# Patient Record
Sex: FEMALE | Race: WHITE | HISPANIC OR LATINO | Employment: OTHER | URBAN - METROPOLITAN AREA
[De-identification: names, ages, dates, MRNs, and addresses within clinical notes are randomized per-mention and may not be internally consistent; named-entity substitution may affect disease eponyms.]

---

## 2017-01-10 DIAGNOSIS — Z79.890 POSTMENOPAUSAL HRT (HORMONE REPLACEMENT THERAPY): ICD-10-CM

## 2017-01-10 DIAGNOSIS — I10 ESSENTIAL HYPERTENSION: ICD-10-CM

## 2017-01-10 NOTE — TELEPHONE ENCOUNTER
Was the patient seen in the last year in this department? Yes     Does patient have an active prescription for medications requested? No     Received Request Via: Pharmacy    This needs to be addressed  ASAP

## 2017-01-11 RX ORDER — ESTRADIOL 1 MG/1
TABLET ORAL
Qty: 90 TAB | Refills: 0 | Status: SHIPPED | OUTPATIENT
Start: 2017-01-11 | End: 2017-06-08 | Stop reason: SDUPTHER

## 2017-01-11 RX ORDER — LISINOPRIL 40 MG/1
40 TABLET ORAL DAILY
Qty: 90 TAB | Refills: 0 | Status: SHIPPED | OUTPATIENT
Start: 2017-01-11 | End: 2018-03-22

## 2017-01-11 RX ORDER — AMLODIPINE BESYLATE 10 MG/1
TABLET ORAL
Qty: 90 TAB | Refills: 0 | Status: SHIPPED | OUTPATIENT
Start: 2017-01-11 | End: 2017-06-21 | Stop reason: SDUPTHER

## 2017-01-11 RX ORDER — LISINOPRIL 40 MG/1
TABLET ORAL
Qty: 90 TAB | Refills: 0 | Status: SHIPPED | OUTPATIENT
Start: 2017-01-11 | End: 2017-06-08 | Stop reason: SDUPTHER

## 2017-01-11 RX ORDER — ESTRADIOL 1 MG/1
1 TABLET ORAL DAILY
Qty: 90 TAB | Refills: 0 | Status: SHIPPED | OUTPATIENT
Start: 2017-01-11 | End: 2018-03-22

## 2017-02-08 ENCOUNTER — TELEPHONE (OUTPATIENT)
Dept: MEDICAL GROUP | Facility: CLINIC | Age: 82
End: 2017-02-08

## 2017-02-08 NOTE — TELEPHONE ENCOUNTER
ANNUAL WELLNESS VISIT PRE-VISIT PLANNING     1.  Reviewed last PCP office visit assessment and plan notes: Yes    2.  If any orders were placed last visit do we have Results/Consult Notes?        •  Labs? Yes        •  Imaging? No        •  Referrals? No     3.  Patient Care Coordination Note was updated with diagnosis information:  No    4.  Patient is due for these Health Maintenance Topics:   Health Maintenance Due   Topic Date Due   • Annual Wellness Visit  01/02/1926   • IMM ZOSTER VACCINE  01/02/1986DUE   • IMM INFLUENZA (1) 09/01/2016          •  CHRISISE letter was faxed to:   for  records    5.  Immunizations were updated in [employer] using WebIZ?: Yes       •  Web Iz Recommendations:  Shingles, tdap       •  Is patient due for Tdap/Shingles? Yes.  If yes, was patient alerted of copay? Yes                                  7.  Updated Care Team with [email or web address] and all specialists?        •   Gait devices, O2, CPAP, etc: no        •   Eye professional: yes       •   Other specialists (GYN, cardiology, endo, etc): N\A    8.  Is patient in need of any refills prior to office visit? No       •    Separate refill encounter created?: no    9.  Patient was informed to arrive 15 min prior to their scheduled appointment and bring in their medication bottles? yes    10.  Patient was advised: “This is a free wellness visit. The provider will screen for medical conditions to help you stay healthy. If you have other concerns to address you may be asked to discuss these at a separate visit or there may be an additional fee.”  Yes

## 2017-02-13 ENCOUNTER — OFFICE VISIT (OUTPATIENT)
Dept: MEDICAL GROUP | Facility: CLINIC | Age: 82
End: 2017-02-13
Payer: MEDICARE

## 2017-02-13 VITALS
TEMPERATURE: 98.1 F | BODY MASS INDEX: 16.93 KG/M2 | WEIGHT: 92 LBS | OXYGEN SATURATION: 96 % | HEIGHT: 62 IN | SYSTOLIC BLOOD PRESSURE: 136 MMHG | HEART RATE: 75 BPM | DIASTOLIC BLOOD PRESSURE: 60 MMHG

## 2017-02-13 DIAGNOSIS — Z72.0 TOBACCO USE: ICD-10-CM

## 2017-02-13 DIAGNOSIS — Z79.890 POSTMENOPAUSAL HRT (HORMONE REPLACEMENT THERAPY): ICD-10-CM

## 2017-02-13 DIAGNOSIS — M25.552 HIP PAIN, LEFT: ICD-10-CM

## 2017-02-13 DIAGNOSIS — I10 ESSENTIAL HYPERTENSION: ICD-10-CM

## 2017-02-13 PROCEDURE — 4004F PT TOBACCO SCREEN RCVD TLK: CPT | Performed by: INTERNAL MEDICINE

## 2017-02-13 PROCEDURE — G0439 PPPS, SUBSEQ VISIT: HCPCS | Performed by: INTERNAL MEDICINE

## 2017-02-13 RX ORDER — HYDROCODONE BITARTRATE AND ACETAMINOPHEN 5; 325 MG/1; MG/1
1 TABLET ORAL EVERY 8 HOURS PRN
Qty: 60 TAB | Refills: 0 | Status: SHIPPED | OUTPATIENT
Start: 2017-02-13 | End: 2017-08-17 | Stop reason: SDUPTHER

## 2017-02-13 ASSESSMENT — PATIENT HEALTH QUESTIONNAIRE - PHQ9: CLINICAL INTERPRETATION OF PHQ2 SCORE: 0

## 2017-02-13 ASSESSMENT — ACTIVITIES OF DAILY LIVING (ADL): TRANSPORTATION COMMENTS: DAUGHTER DRIVES HER

## 2017-02-13 NOTE — MR AVS SNAPSHOT
"Bibi Goldman   2017 11:00 AM   Office Visit   MRN: [id number]    Department:  [facility]   Dept Phone:  [phone]    Description:  Female : 1926   Provider:  Trevor Irby M.D.; [facility]            Reason for Visit     Annual Wellness Visit           Allergies as of 2017     No Known Allergies      You were diagnosed with     Hip pain, left   [[id number]]         Vital Signs     Blood Pressure Pulse Temperature Height Weight Body Mass Index    136/60 mmHg 75 36.7 °C (98.1 °F) 1.575 m (5' 2.01\") 41.731 kg (92 lb) 16.82 kg/m2    Oxygen Saturation Last Menstrual Period Smoking Status             96% 1959 Current Every Day Smoker         Basic Information     Date Of Birth Sex Race Ethnicity Preferred Language    1926 Female  or   Origin (Nepali,Faroese,Stateless,Akbar, etc) English      Problem List              ICD-10-CM Priority Class Noted - Resolved    HTN (hypertension) I10   2011 - Present    Postmenopausal HRT (hormone replacement therapy) Z79.890   2011 - Present    Visual changes H53.9   2011 - Present    Dyslipidemia E78.5   2012 - Present    Hip pain M25.559   2013 - Present    Jaw pain R68.84   2013 - Present    Nuclear cataract of left eye H25.12   2016 - Present      Health Maintenance        Date Due Completion Dates    IMM ZOSTER VACCINE 1986 ---    IMM INFLUENZA (1) 2016 ---    IMM PNEUMOCOCCAL 65+ (ADULT) LOW/MEDIUM RISK SERIES (2 of 2 - PPSV23) 2017    IMM DTaP/Tdap/Td Vaccine (2 - Td) 2021            Current Immunizations     13-VALENT PCV PREVNAR 2016    Tdap Vaccine 2011      Below and/or attached are the medications your provider expects you to take. Review all of your home medications and newly ordered medications with your provider and/or pharmacist. Follow medication instructions as directed by your provider and/or " pharmacist. Please keep your medication list with you and share with your provider. Update the information when medications are discontinued, doses are changed, or new medications (including over-the-counter products) are added; and carry medication information at all times in the event of emergency situations     Allergies:  No Known Allergies          Medications  Valid as of: February 13, 2017 -  1:00 PM    Generic Name Brand Name Tablet Size Instructions for use    AmLODIPine Besylate (Tab) NORVASC 10 MG TAKE ONE TABLET BY MOUTH ONCE DAILY        Cholecalciferol (Tab) cholecalciferol 1000 UNIT Take 1,000 Units by mouth every day.        Docusate Sodium (Cap) COLACE 100 MG Take 100 mg by mouth 2 times a day as needed for Constipation (Takes every other day).        Estradiol (Tab) ESTRACE 1 MG TAKE ONE TABLET BY MOUTH ONCE DAILY        Estradiol (Tab) ESTRACE 1 MG Take 1 Tab by mouth every day.        Hydrocodone-Acetaminophen (Tab) NORCO 5-325 MG Take 1 Tab by mouth every 8 hours as needed.        Lisinopril (Tab) PRINIVIL, ZESTRIL 40 MG TAKE ONE TABLET BY MOUTH ONCE DAILY        Lisinopril (Tab) PRINIVIL, ZESTRIL 40 MG Take 1 Tab by mouth every day.        .                 Medicines prescribed today were sent to:     [facility] [address] [place] [address]    Phone: [phone] Fax: [phone]    Open 24 Hours?: No      Medication refill instructions:       If your prescription bottle indicates you have medication refills left, it is not necessary to call your provider’s office. Please contact your pharmacy and they will refill your medication.    If your prescription bottle indicates you do not have any refills left, you may request refills at any time through one of the following ways: The online Yeelink system (except Urgent Care), by calling your provider’s office, or by asking your pharmacy to contact your provider’s office with a refill  request. Medication refills are processed only during regular business hours and may not be available until the next business day. Your provider may request additional information or to have a follow-up visit with you prior to refilling your medication.   *Please Note: Medication refills are assigned a new Rx number when refilled electronically. Your pharmacy may indicate that no refills were authorized even though a new prescription for the same medication is available at the pharmacy. Please request the medicine by name with the pharmacy before contacting your provider for a refill.        Other Notes About Your Plan     Please query provider on next office visit:    In your medical opinion since BMI < 19 could this patient have dx of Cachexia R64           [employer] Access Code: [email or web address]  Expires: 3/15/2017  1:00 PM    [employer]  A secure, online tool to manage your health information     [employer]’s [employer]® is a secure, online tool that connects you to your personalized health information from the privacy of your home -- day or night - making it very easy for you to manage your healthcare. Once the activation process is completed, you can even access your medical information using the Wave[employer] Engines [place], which is available for free in the Apple [place] store or Google Play store.     [employer] provides the following levels of access (as shown below):   My Chart Features   [facility] Doctor [facility]  Urgent  Care [facility]  Primary Care  Doctor   Email your healthcare team securely and privately 24/7 X X X    Manage appointments: schedule your next appointment; view details of past/upcoming appointments X      Request prescription refills. X      View recent personal medical records, including lab and immunizations X X X X   View health record, including health history, allergies, medications X X X X   Read reports about your outpatient visits, procedures, consult and ER notes X X X X    See your discharge summary, which is a recap of your hospital and/or ER visit that includes your diagnosis, lab results, and care plan. X X       How to register for [employer]Xchange:  1. Go to  [email or web address].  2. Click on the Sign Up Now box, which takes you to the New Member Sign Up page. You will need to provide the following information:  a. Enter your ValveXchange Access Code exactly as it appears at the top of this page. (You will not need to use this code after you’ve completed the sign-up process. If you do not sign up before the expiration date, you must request a new code.)   b. Enter your date of birth.   c. Enter your home email address.   d. Click Submit, and follow the next screen’s instructions.  3. Create a ValveXchange ID. This will be your ValveXchange login ID and cannot be changed, so think of one that is secure and easy to remember.  4. Create a ValveXchange password. You can change your password at any time.  5. Enter your Password Reset Question and Answer. This can be used at a later time if you forget your password.   6. Enter your e-mail address. This allows you to receive e-mail notifications when new information is available in ValveXchange.  7. Click Sign Up. You can now view your health information.    For assistance activating your [employer]Xchange account, call [phone]

## 2017-02-13 NOTE — PROGRESS NOTES
Chief Complaint   Patient presents with   • Annual Wellness Visit         HPI:   Bibi is a 89 y.o. female here for Medicare Annual Wellness Visit, currently on lisinopril and amlodipine for hypertension denies having dry cough or lower extremity edema, on  hydrocodone as needed for arthritic hip pain and Colace as needed for constipation, denies having abdominal pain, on Estrace for HRT denies having chest pain, motor or sensory loss.        Patient Active Problem List    Diagnosis Date Noted   • Nuclear cataract of left eye 01/12/2016   • Hip pain 08/26/2013   • Jaw pain 08/26/2013   • Dyslipidemia 02/23/2012   • HTN (hypertension) 12/01/2011   • Postmenopausal HRT (hormone replacement therapy) 12/01/2011   • Visual changes 12/01/2011       Current Outpatient Prescriptions   Medication Sig Dispense Refill   • lisinopril (PRINIVIL, ZESTRIL) 40 MG tablet TAKE ONE TABLET BY MOUTH ONCE DAILY 90 Tab 0   • estradiol (ESTRACE) 1 MG Tab TAKE ONE TABLET BY MOUTH ONCE DAILY 90 Tab 0   • amlodipine (NORVASC) 10 MG Tab TAKE ONE TABLET BY MOUTH ONCE DAILY 90 Tab 0   • hydrocodone-acetaminophen (NORCO) 5-325 MG Tab per tablet Take 1 Tab by mouth every 8 hours as needed. 60 Tab 0   • vitamin D (CHOLECALCIFEROL) 1000 UNIT Tab Take 1,000 Units by mouth every day.     • estradiol (ESTRACE) 1 MG Tab Take 1 Tab by mouth every day. 90 Tab 0   • lisinopril (PRINIVIL, ZESTRIL) 40 MG tablet Take 1 Tab by mouth every day. 90 Tab 0   • docusate sodium (COLACE) 100 MG Cap Take 100 mg by mouth 2 times a day as needed for Constipation (Takes every other day).       No current facility-administered medications for this visit.           If not adherent, not taking due to:  Current supplements as per medication list.   Chronic narcotic pain medicines: yes    Allergies: Review of patient's allergies indicates no known allergies.    Current social contact/activities: spends time with family     Is patient current with immunizations?  no   If no,  due for shingles, tdap    She  reports that she has been smoking Cigarettes.  She has a 3.75 pack-year smoking history. She has never used smokeless tobacco. She reports that she drinks about 1.2 oz of alcohol per week. She reports that she does not use illicit drugs.  Ready to quit: Not Answered  Counseling given: Yes        DPA/Advanced Directive:  Patient does not have an advanced directive.  A packet and workshop information was provided    ROS:    Gait: Uses no assistive device   Ostomy: no   Other tubes: no   Amputations: no   Chronic oxygen use no   Last eye exam 1 year ago    : Denies incontinence.             Depression Screening    Little interest or pleasure in doing things?  0 - not at all  Feeling down, depressed, or hopeless?  0 - not at all  Patient Health Questionnaire Score: 0    If depressive symptoms identified deferred to follow up visit unless specifically addressed in assessment and plan.    Screening for Cognitive Impairment    Three Minute Recall (banana, sunrise, fence)  0/3    Draw clock face with all 12 numbers set to the hand to show 10 minutes past 11 o'clock  1 3/5  Cognitive concerns identified deferred for follow up unless specifically addressed in assessment and plan.    Fall Risk Assessment    Has the patient had two or more falls in the last year or any fall with injury in the last year?  No    Safety Assessment    Throw rugs on floor.  Yes  Handrails on all stairs.  Yes  Good lighting in all hallways.  Yes  Difficulty hearing.  No  Patient counseled about all safety risks that were identified.    Functional Assessment ADLs    Are there any barriers preventing you from cooking for yourself or meeting nutritional needs?  No.    Are there any barriers preventing you from driving safely or obtaining transportation?  Yes. Daughter drives her  Are there any barriers preventing you from using a telephone or calling for help?  No.    Are there any barriers preventing you from shopping?   Yes. Daughter does all the shopping/lives with daughter  Are there any barriers preventing you from taking care of your own finances?  Yes. Daughter does this for her   Are there any barriers preventing you from managing your medications?  Yes. Daughter does this for her   Are currently engaging any exercise or physical activity?  Yes.  rocael    Health Maintenance Summary                Annual Wellness Visit Overdue 1/2/1926     IMM ZOSTER VACCINE Overdue 1/2/1986     IMM INFLUENZA Overdue 9/1/2016     IMM PNEUMOCOCCAL 65+ (ADULT) LOW/MEDIUM RISK SERIES Next Due 11/7/2017      Done 11/7/2016 Imm Admin: Pneumococcal Conjugate Vaccine (Prevnar/PCV-13)    IMM DTaP/Tdap/Td Vaccine Next Due 1/27/2021      Done 1/27/2011 Imm Admin: Tdap Vaccine          Patient Care Team:  Trevor Irby M.D. as PCP - General    Social History   Substance Use Topics   • Smoking status: Current Every Day Smoker -- 0.25 packs/day for 15 years     Types: Cigarettes   • Smokeless tobacco: Never Used   • Alcohol Use: 1.2 oz/week     1 Cans of beer, 1 Shots of liquor per week      Comment: 1 daily in the evening       Family History   Problem Relation Age of Onset   • Lung Disease Mother    • Diabetes Maternal Aunt      She  has a past medical history of HTN; Postmenopausal HRT (hormone replacement therapy); MEDICAL HOME; Arthritis; Heart murmur; Hiatus hernia syndrome; Cataract; and Hip pain (8/26/2013).   Past Surgical History   Procedure Laterality Date   • Abdominal hysterectomy total  1960           Exam:     Last menstrual period 01/01/1959. There is no weight on file to calculate BMI.    Hearing; good   Dentition ;  Lower implant  Alert, oriented in no acute distress.  Eye contact is good, speech goal directed, affect calm      Assessment and Plan. The following treatment and monitoring plan is recommended:    1. Hypertension; blood pressure is stable on amlodipine 10 mg on lisinopril 40 mg daily.    2. HRT/postmenopausal syndrome;  clinically is stable on Estrace 1 mg daily.    3. chronic intermittent left hip pain; per patient responds to Norco 5/325 mg as needed without any side effects.     4. Chronic tobacco and alcohol use; patient refuses to quit smoking.            Patient is advised regarding preventive and supportive care, diet and lifestyle modification, daily walking ,exercise , tobacco and alcohol cessation, refill medications.    Please note that this dictation was created using voice recognition software. I have worked with consultants from the vendor as well as technical experts from [facility] [employer] to optimize the interface. I have made every reasonable attempt to correct obvious errors, but I expect that there are errors of grammar and possibly content that I did not discover before finalizing the note.                            Services needed:   Health Care Screening recommendations as per orders if indicated.  Referrals offered: PT/OT/Nutrition counseling/Behavioral Health/Smoking cessation as per orders if indicated.    Discussion today about general wellness and lifestyle habits:    · Prevent falls and reduce trip hazards; Cautioned about securing or removing rugs.  · Have a working fire alarm and carbon monoxide detector;   · Engage in regular physical activity and social activities   ·       Follow-up: per asppt

## 2017-06-09 RX ORDER — LISINOPRIL 40 MG/1
TABLET ORAL
Qty: 90 TAB | Refills: 0 | Status: SHIPPED | OUTPATIENT
Start: 2017-06-09 | End: 2017-09-18 | Stop reason: SDUPTHER

## 2017-06-09 RX ORDER — ESTRADIOL 1 MG/1
TABLET ORAL
Qty: 90 TAB | Refills: 0 | Status: SHIPPED | OUTPATIENT
Start: 2017-06-09 | End: 2018-03-22

## 2017-06-21 RX ORDER — AMLODIPINE BESYLATE 10 MG/1
TABLET ORAL
Qty: 90 TAB | Refills: 0 | Status: SHIPPED | OUTPATIENT
Start: 2017-06-21 | End: 2017-09-18 | Stop reason: SDUPTHER

## 2017-08-17 DIAGNOSIS — M25.552 HIP PAIN, LEFT: ICD-10-CM

## 2017-08-18 RX ORDER — HYDROCODONE BITARTRATE AND ACETAMINOPHEN 5; 325 MG/1; MG/1
1 TABLET ORAL EVERY 8 HOURS PRN
Qty: 60 TAB | Refills: 0 | Status: SHIPPED | OUTPATIENT
Start: 2017-08-18 | End: 2018-07-25 | Stop reason: SDUPTHER

## 2017-08-25 NOTE — TELEPHONE ENCOUNTER
Patient daughter called upset about her mothers Hydrocodone, Located it in the folder in the front . She stated that she had never been left any messages concerning this,  She was informed that she could pick this up at her earliest conveince  .

## 2017-09-18 RX ORDER — AMLODIPINE BESYLATE 10 MG/1
TABLET ORAL
Qty: 90 TAB | Refills: 0 | Status: SHIPPED | OUTPATIENT
Start: 2017-09-18 | End: 2018-03-22 | Stop reason: SDUPTHER

## 2017-09-18 RX ORDER — LISINOPRIL 40 MG/1
TABLET ORAL
Qty: 90 TAB | Refills: 0 | Status: SHIPPED | OUTPATIENT
Start: 2017-09-18 | End: 2018-03-22 | Stop reason: SDUPTHER

## 2017-12-04 ENCOUNTER — PATIENT OUTREACH (OUTPATIENT)
Dept: HEALTH INFORMATION MANAGEMENT | Facility: OTHER | Age: 82
End: 2017-12-04

## 2017-12-04 NOTE — PROGRESS NOTES
Attempt #:1    WebIZ Checked & Epic Updated: Yes  · WebIZ Recommendations: FLU, PNEUMOVAX (PPSV23) and ZOSTAVAX (Shingles)  · Is patient due for Tdap? NO  · Is patient due for Shingles? YES. Patient was not notified of copay/out of pocket cost.  HealthConnect Verified: yes  Verify PCP: yes    Communication Preference Obtained: yes     Review Care Team: yes    Annual Wellness Visit Scheduling  1. Scheduling Status:Scheduled    Care Gap Scheduling (Attempt to Schedule EACH Overdue Care Gap!)     Health Maintenance Due   Topic Date Due   • Annual Wellness Visit  01/02/1926   • IMM ZOSTER VACCINE  01/02/1986   • IMM INFLUENZA (1) 09/01/2017   • IMM PNEUMOCOCCAL 65+ (ADULT) LOW/MEDIUM RISK SERIES (2 of 2 - PPSV23) 11/07/2017        Scheduled patient for Annual Wellness Visit       [employer] Activation: sent activation code  [employer] [place]: no  Virtual Visits: no  Opt In to Text Messages: no

## 2018-01-31 ENCOUNTER — TELEPHONE (OUTPATIENT)
Dept: MEDICAL GROUP | Facility: PHYSICIAN GROUP | Age: 83
End: 2018-01-31

## 2018-01-31 NOTE — TELEPHONE ENCOUNTER
"I spoke with patient's daughter Preeti and she states \"I know you can't disclose where  went as you want to keep his patients\".  I did inform Preeti that this is not the case we just do not have any information about his leave.  I asked her if she would like the number to central scheduling to find a PCP in her area and she stated she had it and call was ended.  As I am not sure if Preeti intentionally ended the call I called her back and left her a voicemail with the number to arrange a new patient appointment.   "

## 2018-01-31 NOTE — TELEPHONE ENCOUNTER
VOICEMAIL  1. Caller Name: Preeti - daughter                     Call Back Number: [phone] (home)     2. Message: Patient's daughter LVM requesting a call back in regards to  no longer being at [facility].     3. Patient approves office to leave a detailed voicemail/MyChart message: yes

## 2018-03-22 ENCOUNTER — OFFICE VISIT (OUTPATIENT)
Dept: MEDICAL GROUP | Facility: PHYSICIAN GROUP | Age: 83
End: 2018-03-22
Payer: MEDICARE

## 2018-03-22 VITALS
RESPIRATION RATE: 14 BRPM | TEMPERATURE: 97.5 F | OXYGEN SATURATION: 96 % | HEART RATE: 78 BPM | WEIGHT: 88 LBS | HEIGHT: 61 IN | BODY MASS INDEX: 16.62 KG/M2

## 2018-03-22 DIAGNOSIS — E78.5 DYSLIPIDEMIA: ICD-10-CM

## 2018-03-22 DIAGNOSIS — I10 ESSENTIAL HYPERTENSION: ICD-10-CM

## 2018-03-22 DIAGNOSIS — Z72.0 TOBACCO ABUSE: ICD-10-CM

## 2018-03-22 DIAGNOSIS — R63.4 WEIGHT LOSS: ICD-10-CM

## 2018-03-22 DIAGNOSIS — M25.552 PAIN OF LEFT HIP JOINT: ICD-10-CM

## 2018-03-22 DIAGNOSIS — Z91.81 RISK FOR FALLS: ICD-10-CM

## 2018-03-22 PROCEDURE — 99214 OFFICE O/P EST MOD 30 MIN: CPT | Performed by: NURSE PRACTITIONER

## 2018-03-22 RX ORDER — LISINOPRIL 40 MG/1
40 TABLET ORAL DAILY
Qty: 90 TAB | Refills: 3 | Status: SHIPPED | OUTPATIENT
Start: 2018-03-22 | End: 2019-06-29 | Stop reason: SDUPTHER

## 2018-03-22 RX ORDER — AMLODIPINE BESYLATE 10 MG/1
10 TABLET ORAL DAILY
Qty: 90 TAB | Refills: 3 | Status: SHIPPED | OUTPATIENT
Start: 2018-03-22 | End: 2019-06-29 | Stop reason: SDUPTHER

## 2018-03-22 ASSESSMENT — PATIENT HEALTH QUESTIONNAIRE - PHQ9: CLINICAL INTERPRETATION OF PHQ2 SCORE: 0

## 2018-03-22 NOTE — PROGRESS NOTES
"Chief Complaint   Patient presents with   • Establish Care   • Medication Refill   • Weight Loss   • Hypertension       Subjective:   Bibi Goldman is a 89 y.o. female here today to establish care and for evaluation and management of:    HTN (hypertension)  Taking lisinopril 40 mg, amlodipine 10 mg daily.      Dyslipidemia  Not currently on any medications.     Hip pain  Taking norco 5/325 twice a week, taking 1/2 pill at a time.      Weight loss  Daughter reports that she has had recent weight loss.  Weight today is 88 pounds down from 92.  Daughter reports that she has memory loss and is feeding dog her food.     Risk for falls  No recent falls reported.          Current medicines (including changes today)  Current Outpatient Prescriptions   Medication Sig Dispense Refill   • lisinopril (PRINIVIL, ZESTRIL) 40 MG tablet Take 1 Tab by mouth every day. 90 Tab 3   • amLODIPine (NORVASC) 10 MG Tab Take 1 Tab by mouth every day. 90 Tab 3   • hydrocodone-acetaminophen (NORCO) 5-325 MG Tab per tablet Take 1 Tab by mouth every 8 hours as needed. 60 Tab 0   • vitamin D (CHOLECALCIFEROL) 1000 UNIT Tab Take 1,000 Units by mouth every day.       No current facility-administered medications for this visit.      She  has a past medical history of Arthritis; Cataract; Heart murmur; Hiatus hernia syndrome; Hip pain (8/26/2013); HTN; Hypertension; MEDICAL HOME; and Postmenopausal HRT (hormone replacement therapy).    ROS as stated in hpi  No chest pain, no shortness of breath, no abdominal pain       Objective:     Pulse 78, temperature 36.4 °C (97.5 °F), resp. rate 14, height 1.549 m (5' 1\"), weight 39.9 kg (88 lb), last menstrual period 01/01/1959, SpO2 96 %. Body mass index is 16.63 kg/m². Weight is down 4 pounds since February.   Physical Exam:  Constitutional: Alert, no distress.  Skin: Warm, dry, good turgor,no cyanosis, no rashes in visible areas.  Eye: Equal, round and reactive, conjunctiva clear, lids normal.  Ears: No " tenderness, no discharge.  External canals are without any significant edema or erythema..  Gross auditory acuity is intact.  Nose: symmetrical without tenderness, no discharge.  Mouth/Throat: lips without lesion.  Oropharynx clear.   Neck: Trachea midline, no masses, no obvious thyroid enlargement.. No cervical or supraclavicular lymphadenopathy. Range of motion within normal limits.  Neuro: Cranial nerves 2-12 grossly intact.  No sensory deficit.  Respiratory: Unlabored respiratory effort, lungs clear to auscultation, no wheezes, no ronchi.  Cardiovascular: Normal S1, S2, no murmur, no edema.  Abdomen: Soft, non-tender, no masses, no guarding,  no hepatosplenomegaly.  Psych: Alert and oriented X2.  Short term memory loss.          Assessment and Plan:   The following treatment plan was discussed    1. Essential hypertension  This is a new problem to me. Chronic. Stable. Continue with lisinopril and amlodipine daily. Monitor and follow.    2. Dyslipidemia  This is a new problem to me. Chronic. No longer on medications. BMI 16.63. Monitor and follow.    3. Pain of left hip joint  This is a new problem to me. Chronic. Ongoing issue. Taking Norco one to 2 tablets weekly. 4 chronic hip pain. Will return in 6 weeks for weight check and UDS.    4. Risk for falls  This is a new problem to me. Chronic. Ongoing issue. Discussed fall risks with patient and her daughter. Discussed importance of removing any throw rugs.  - Patient identified as fall risk.  Appropriate orders and counseling given.    5. Weight loss  This is a new problem to me. Acute. Unknown etiology. Discussed high calorie snacks that patient may enjoy. Patient to return in 6 weeks for weight check. Also will check a CBC and chemistry panel to rule out any metabolic issues or anemia. Monitor and follow.  - CBC WITH DIFFERENTIAL; Future  - COMP METABOLIC PANEL; Future    6. Tobacco abuse  This is a new problem to me. Chronic ongoing issue. Daily smoker. She is  not on any COPD medications. O2 saturation 96% today. Monitor.      Followup: Return in about 6 weeks (around 5/3/2018) for weight loss/uds.

## 2018-03-22 NOTE — ASSESSMENT & PLAN NOTE
Daughter reports that she has had recent weight loss.  Weight today is 88 pounds down from 92.  Daughter reports that she has memory loss and is feeding dog her food.

## 2018-05-10 ENCOUNTER — APPOINTMENT (OUTPATIENT)
Dept: MEDICAL GROUP | Facility: PHYSICIAN GROUP | Age: 83
End: 2018-05-10
Payer: MEDICARE

## 2018-07-10 ENCOUNTER — HOSPITAL ENCOUNTER (OUTPATIENT)
Dept: LAB | Facility: MEDICAL CENTER | Age: 83
End: 2018-07-10
Attending: NURSE PRACTITIONER
Payer: MEDICARE

## 2018-07-10 DIAGNOSIS — R63.4 WEIGHT LOSS: ICD-10-CM

## 2018-07-10 LAB
ALBUMIN SERPL BCP-MCNC: 4.1 G/DL (ref 3.2–4.9)
ALBUMIN/GLOB SERPL: 1.4 G/DL
ALP SERPL-CCNC: 105 U/L (ref 30–99)
ALT SERPL-CCNC: 10 U/L (ref 2–50)
ANION GAP SERPL CALC-SCNC: 7 MMOL/L (ref 0–11.9)
AST SERPL-CCNC: 17 U/L (ref 12–45)
BASOPHILS # BLD AUTO: 0.6 % (ref 0–1.8)
BASOPHILS # BLD: 0.07 K/UL (ref 0–0.12)
BILIRUB SERPL-MCNC: 0.6 MG/DL (ref 0.1–1.5)
BUN SERPL-MCNC: 13 MG/DL (ref 8–22)
CALCIUM SERPL-MCNC: 9.5 MG/DL (ref 8.5–10.5)
CHLORIDE SERPL-SCNC: 101 MMOL/L (ref 96–112)
CO2 SERPL-SCNC: 28 MMOL/L (ref 20–33)
CREAT SERPL-MCNC: 0.93 MG/DL (ref 0.5–1.4)
EOSINOPHIL # BLD AUTO: 0.12 K/UL (ref 0–0.51)
EOSINOPHIL NFR BLD: 1.1 % (ref 0–6.9)
ERYTHROCYTE [DISTWIDTH] IN BLOOD BY AUTOMATED COUNT: 46.5 FL (ref 35.9–50)
GLOBULIN SER CALC-MCNC: 2.9 G/DL (ref 1.9–3.5)
GLUCOSE SERPL-MCNC: 114 MG/DL (ref 65–99)
HCT VFR BLD AUTO: 46.5 % (ref 37–47)
HGB BLD-MCNC: 15.4 G/DL (ref 12–16)
IMM GRANULOCYTES # BLD AUTO: 0.02 K/UL (ref 0–0.11)
IMM GRANULOCYTES NFR BLD AUTO: 0.2 % (ref 0–0.9)
LYMPHOCYTES # BLD AUTO: 4.21 K/UL (ref 1–4.8)
LYMPHOCYTES NFR BLD: 39.1 % (ref 22–41)
MCH RBC QN AUTO: 30.9 PG (ref 27–33)
MCHC RBC AUTO-ENTMCNC: 33.1 G/DL (ref 33.6–35)
MCV RBC AUTO: 93.4 FL (ref 81.4–97.8)
MONOCYTES # BLD AUTO: 0.6 K/UL (ref 0–0.85)
MONOCYTES NFR BLD AUTO: 5.6 % (ref 0–13.4)
NEUTROPHILS # BLD AUTO: 5.75 K/UL (ref 2–7.15)
NEUTROPHILS NFR BLD: 53.4 % (ref 44–72)
NRBC # BLD AUTO: 0 K/UL
NRBC BLD-RTO: 0 /100 WBC
PLATELET # BLD AUTO: 357 K/UL (ref 164–446)
PMV BLD AUTO: 9.9 FL (ref 9–12.9)
POTASSIUM SERPL-SCNC: 4.2 MMOL/L (ref 3.6–5.5)
PROT SERPL-MCNC: 7 G/DL (ref 6–8.2)
RBC # BLD AUTO: 4.98 M/UL (ref 4.2–5.4)
SODIUM SERPL-SCNC: 136 MMOL/L (ref 135–145)
WBC # BLD AUTO: 10.8 K/UL (ref 4.8–10.8)

## 2018-07-10 PROCEDURE — 85025 COMPLETE CBC W/AUTO DIFF WBC: CPT

## 2018-07-10 PROCEDURE — 80053 COMPREHEN METABOLIC PANEL: CPT

## 2018-07-10 PROCEDURE — 36415 COLL VENOUS BLD VENIPUNCTURE: CPT

## 2018-07-11 ENCOUNTER — TELEPHONE (OUTPATIENT)
Dept: MEDICAL GROUP | Facility: PHYSICIAN GROUP | Age: 83
End: 2018-07-11

## 2018-07-11 NOTE — LETTER
July 11, 2018         Bibi Goldman  [address] [facility] [address]        Dear Bibi:      Below are the results from your recent visit:    Please let patient know that all of her labs are in the acceptable range.  Blood sugar was slightly elevated, not sure if this was a fasting lab sample or not?  Please let us know if you have any questions.   EVA Durbin     Resulted Orders   CBC WITH DIFFERENTIAL   Result Value Ref Range    WBC 10.8 4.8 - 10.8 K/uL    RBC 4.98 4.20 - 5.40 M/uL    Hemoglobin 15.4 12.0 - 16.0 g/dL    Hematocrit 46.5 37.0 - 47.0 %    MCV 93.4 81.4 - 97.8 fL    MCH 30.9 27.0 - 33.0 pg    MCHC 33.1 (L) 33.6 - 35.0 g/dL    RDW 46.5 35.9 - 50.0 fL    Platelet Count 357 164 - 446 K/uL    MPV 9.9 9.0 - 12.9 fL    Neutrophils-Polys 53.40 44.00 - 72.00 %    Lymphocytes 39.10 22.00 - 41.00 %    Monocytes 5.60 0.00 - 13.40 %    Eosinophils 1.10 0.00 - 6.90 %    Basophils 0.60 0.00 - 1.80 %    Immature Granulocytes 0.20 0.00 - 0.90 %    Nucleated RBC 0.00 /100 WBC    Neutrophils (Absolute) 5.75 2.00 - 7.15 K/uL      Comment:      Includes immature neutrophils, if present.    Lymphs (Absolute) 4.21 1.00 - 4.80 K/uL    Monos (Absolute) 0.60 0.00 - 0.85 K/uL    Eos (Absolute) 0.12 0.00 - 0.51 K/uL    Baso (Absolute) 0.07 0.00 - 0.12 K/uL    Immature Granulocytes (abs) 0.02 0.00 - 0.11 K/uL    NRBC (Absolute) 0.00 K/uL    Narrative    Request patient fasting?->Yes   COMP METABOLIC PANEL   Result Value Ref Range    Sodium 136 135 - 145 mmol/L    Potassium 4.2 3.6 - 5.5 mmol/L    Chloride 101 96 - 112 mmol/L    Co2 28 20 - 33 mmol/L    Anion Gap 7.0 0.0 - 11.9    Glucose 114 (H) 65 - 99 mg/dL    Bun 13 8 - 22 mg/dL    Creatinine 0.93 0.50 - 1.40 mg/dL    Calcium 9.5 8.5 - 10.5 mg/dL    AST(SGOT) 17 12 - 45 U/L    ALT(SGPT) 10 2 - 50 U/L    Alkaline Phosphatase 105 (H) 30 - 99 U/L    Total Bilirubin 0.6 0.1 - 1.5 mg/dL    Albumin 4.1 3.2 - 4.9 g/dL    Total Protein 7.0 6.0 - 8.2 g/dL    Globulin 2.9 1.9 - 3.5 g/dL    A-G Ratio 1.4 g/dL    Narrative    Request patient fasting?->Yes   ESTIMATED GFR   Result Value Ref Range    GFR If African American >60 >60 mL/min/1.73 m 2    GFR If Non  56 (A) >60 mL/min/1.73 m 2    Narrative    Request patient fasting?->Yes     If you have any questions or concerns, please don't hesitate to call.    Electronically Signed

## 2018-07-11 NOTE — TELEPHONE ENCOUNTER
----- Message from EVA Durbin sent at 7/11/2018  7:26 AM PDT -----  Please let patient know that all of her labs are in the acceptable range.  Blood sugar was slightly elevated, not sure if this was a fasting lab sample or not?  Please let us know if you have any questions.  EVA Durbin

## 2018-07-25 DIAGNOSIS — M25.552 HIP PAIN, LEFT: ICD-10-CM

## 2018-07-25 NOTE — TELEPHONE ENCOUNTER
VOICEMAIL  1. Caller Name: Preeti (daughter)                      Call Back Number: [phone]    2. Message: Patient's daughter called and they need an URGENT referral to home health for temporary nursing and house hold care. Daughter is leaving town and Patti needs some help. Also a refill of her Norco, she states you wanted lab work prior to refilling? Please advise     3. Patient approves office to leave a detailed voicemail/MyChart message: N\A          PATTI MACARIO     Age: 89  demographics   Data as of: 7/25/2018              NARCOTIC 120        SEDATIVE 050       STIMULANT 000       NARxSCORES can range from 000 to 999. The first two digits represent the composite percentile risk based on an overall analysis of prescription drug use. The third digit represents the number of active prescriptions. The distribution of scores in the population is such that approximately 75% fall below 200, 95% fall below 500 and 99% fall below 650. The information on this report is not warranted as accurate or complete. This report is based on the search criteria supplied and the data entered by the dispensing pharmacy. For more information about any prescription, please contact the dispensing pharmacy or the prescriber. NARxSCORES and Reports are intended to aid, not replace medical decision making. None of the information presented should be used as sole justification for providing or refusing to provide medications.       Rx Graph   [x] Narcotic [x] Sedative [x] Stimulant [x] Buprenorphine [x] Other    Created with Raphaël [id number] - Trevor Irby  Morphine MgEq (MME)  Created with Raphaël [id number]  Created with Raphaël [id number]  *Per CDC guidance, the MME conversion factors prescribed or provided as part of the medication-assisted treatment for opioid use disorder should not be used to benchmark against dosage thresholds meant for opioids prescribed for pain.  Buprenorphine products have no agreed upon morphine equivalency, and as partial opioid agonists, are not expected to be associated with overdose risk in the same dose-dependent manner as doses for full agonist opioids. MME = morphine milligram equivalents. LME = Lorazepam milligram equivalents. mg = dose in milligrams.     Data Analysis   Narcotics (120)  2 months  6 months  1 year  2 years    Prescribers (narcotic, sedative) 0  0  0  0  1  8  1  6    Pharmacies (narcotic, sedative) 0  0  0  0  1  13  1  10    Morphine mg 0  0  0  0  300  47  900  67    Morphine overlap (1) 0  0  0  0  0  0  0  0            Sedatives (050)  2 months  6 months  1 year  2 years    Prescribers (narcotic, sedative) 0  0  0  0  1  8  1  6    Pharmacies (narcotic, sedative) 0  0  0  0  1  13  1  10    Sedative mg 0  0  0  0  0  0  0  0    Sedative overlap (1) 0  0  0  0  0  0  0  0            (1) Number of days for which a simliar type of medication was prescribed from different prescribers for use on the same day.         Summary   Total Prescriptions: 3   Total Prescribers: 1   Total Pharmacies: 1   Narcotics* (excluding buprenorphine):   Current Qty: 0   Current MME/day: 0.00   30 Day Avg MME/day: 0.00   Buprenorphine*   Current Qty: 0   Current mg/day: 0.00   30 Day Avg mg/day: 0.00   Sedatives*   Current Qty: 0   Current LME/day: 0.00   30 Day Avg LME/day: 0.00     Prescriptions Total Prescriptions: 3 Private Pay: 0   Fill Date PT Written Drug Qty Days Rx # Prescriber Pharmacy Refill Daily Dose * Pymt Type    08/30/2017 1  08/18/2017 HYDROCODONE-ACETAMIN 5-325 MG 60 20 [id number] [facility] 0 15.00 [facility] [place]   02/13/2017 1  02/13/2017 HYDROCODONE-ACETAMIN 5-325 MG 60 20 [id number] [facility] 0 15.00 [facility] [place]   08/31/2016 1  08/29/2016 HYDROCODONE-ACETAMIN 5-325 MG 60 20 [id number] [facility] 0 15.00 [facility] [place]     Providers Total Providers: 1   [facility]    [facility] [address] [place] [address] [id number]     Pharmacies Total Pharmacies: 1   Name Address [facility] Zipcode [facility]  [address] [facility] [place] [address] [phone]

## 2018-07-26 RX ORDER — HYDROCODONE BITARTRATE AND ACETAMINOPHEN 5; 325 MG/1; MG/1
1 TABLET ORAL EVERY 8 HOURS PRN
Qty: 60 TAB | Refills: 0 | Status: SHIPPED | OUTPATIENT
Start: 2018-07-26 | End: 2018-08-25

## 2018-07-26 NOTE — TELEPHONE ENCOUNTER
Requested Prescriptions     Pending Prescriptions Disp Refills   • HYDROcodone-acetaminophen (NORCO) 5-325 MG Tab per tablet 60 Tab 0     Sig: Take 1 Tab by mouth every 8 hours as needed.     Urgent referral placed for home health  EVA Durbin

## 2018-07-27 ENCOUNTER — HOME HEALTH ADMISSION (OUTPATIENT)
Dept: HOME HEALTH SERVICES | Facility: HOME HEALTHCARE | Age: 83
End: 2018-07-27
Payer: MEDICARE

## 2018-10-12 ENCOUNTER — PATIENT OUTREACH (OUTPATIENT)
Dept: HEALTH INFORMATION MANAGEMENT | Facility: OTHER | Age: 83
End: 2018-10-12

## 2018-10-12 NOTE — PROGRESS NOTES
Outcome: Left Message    Please transfer to Patient Outreach Team at [phone] when patient returns call.    WebIZ Checked & Epic Updated:  yes  PPSV23   Influenza w/preserv.   Tdap   Zoster Neal (Shingrix)     [facility] Verified: yes  Effective Date: 1/1/2018     Attempt # 1

## 2018-10-30 NOTE — PROGRESS NOTES
Outcome: Left Message    Please transfer to Patient Outreach Team at [phone] when patient returns call.    Attempt # 2

## 2018-11-07 NOTE — PROGRESS NOTES
Outcome: Spoke with Preeti the patient's daughter and she scheduled a follow up visit with her provider.     Please transfer to Patient Outreach Team at [phone] when patient returns call.    Attempt # 3

## 2019-01-04 ENCOUNTER — OFFICE VISIT (OUTPATIENT)
Dept: MEDICAL GROUP | Facility: PHYSICIAN GROUP | Age: 84
End: 2019-01-04
Payer: MEDICARE

## 2019-01-04 ENCOUNTER — HOSPITAL ENCOUNTER (OUTPATIENT)
Facility: MEDICAL CENTER | Age: 84
End: 2019-01-04
Attending: NURSE PRACTITIONER
Payer: MEDICARE

## 2019-01-04 VITALS
OXYGEN SATURATION: 96 % | WEIGHT: 87 LBS | SYSTOLIC BLOOD PRESSURE: 162 MMHG | RESPIRATION RATE: 14 BRPM | BODY MASS INDEX: 16.42 KG/M2 | HEIGHT: 61 IN | DIASTOLIC BLOOD PRESSURE: 50 MMHG | TEMPERATURE: 98.2 F | HEART RATE: 82 BPM

## 2019-01-04 DIAGNOSIS — R15.9 FULL INCONTINENCE OF FECES: ICD-10-CM

## 2019-01-04 DIAGNOSIS — R01.1 HEART MURMUR, SYSTOLIC: ICD-10-CM

## 2019-01-04 DIAGNOSIS — Z23 NEED FOR VACCINATION: ICD-10-CM

## 2019-01-04 LAB
APPEARANCE UR: CLEAR
BILIRUB UR STRIP-MCNC: NEGATIVE MG/DL
COLOR UR AUTO: YELLOW
GLUCOSE UR STRIP.AUTO-MCNC: NEGATIVE MG/DL
KETONES UR STRIP.AUTO-MCNC: NEGATIVE MG/DL
LEUKOCYTE ESTERASE UR QL STRIP.AUTO: NORMAL
NITRITE UR QL STRIP.AUTO: NEGATIVE
PH UR STRIP.AUTO: 7 [PH] (ref 5–8)
PROT UR QL STRIP: NEGATIVE MG/DL
RBC UR QL AUTO: NORMAL
SP GR UR STRIP.AUTO: 1.01
UROBILINOGEN UR STRIP-MCNC: 0.2 MG/DL

## 2019-01-04 PROCEDURE — 90662 IIV NO PRSV INCREASED AG IM: CPT | Performed by: NURSE PRACTITIONER

## 2019-01-04 PROCEDURE — 99000 SPECIMEN HANDLING OFFICE-LAB: CPT | Performed by: NURSE PRACTITIONER

## 2019-01-04 PROCEDURE — G0008 ADMIN INFLUENZA VIRUS VAC: HCPCS | Performed by: NURSE PRACTITIONER

## 2019-01-04 PROCEDURE — 99213 OFFICE O/P EST LOW 20 MIN: CPT | Mod: 25 | Performed by: NURSE PRACTITIONER

## 2019-01-04 PROCEDURE — 81002 URINALYSIS NONAUTO W/O SCOPE: CPT | Performed by: NURSE PRACTITIONER

## 2019-01-04 PROCEDURE — 81001 URINALYSIS AUTO W/SCOPE: CPT

## 2019-01-04 ASSESSMENT — PATIENT HEALTH QUESTIONNAIRE - PHQ9: CLINICAL INTERPRETATION OF PHQ2 SCORE: 0

## 2019-01-04 NOTE — ASSESSMENT & PLAN NOTE
Systolic heart murmur noted on exam.  Every day smoker. Had echo in 2016.  Not seeing cardiology at this time.   Normal left ventricular chamber size and wall motion with concentric   hypertrophy.  Grade I diastolic dysfunction is present.  Left ventricular ejection fraction is 60% to 65%.  Mildly dilated left atrium.  Mitral annular calcification.  Mild mitral regurgitation.  Calcified aortic valve  leaflets with peak instantaneous gradient of of   33 mmHG and mild aortic insufficiency c/w mild aortic stenosis.  Structurally normal tricuspid valve.  Mild tricuspid regurgitation.  Right ventricular systolic pressure is estimated to be 29mmHg.

## 2019-01-04 NOTE — ASSESSMENT & PLAN NOTE
Reports fecal incontinence daily.  No diarrhea reported.  No history of constipation. Patient is very forgetful and does not remember that she is incontinent. Daughter reports that this is been happening for the past year.

## 2019-01-04 NOTE — PROGRESS NOTES
Chief Complaint   Patient presents with   • GI Problem       Subjective:   Bibi Goldman is a 89 y.o. female here today for evaluation and management of:    Full incontinence of feces  Reports fecal incontinence daily.  No diarrhea reported.  No history of constipation. Patient is very forgetful and does not remember that she is incontinent. Daughter reports that this is been happening for the past year.     Heart murmur, systolic  Systolic heart murmur noted on exam.  Every day smoker. Had echo in 2016.  Not seeing cardiology at this time.   Normal left ventricular chamber size and wall motion with concentric   hypertrophy.  Grade I diastolic dysfunction is present.  Left ventricular ejection fraction is 60% to 65%.  Mildly dilated left atrium.  Mitral annular calcification.  Mild mitral regurgitation.  Calcified aortic valve  leaflets with peak instantaneous gradient of of   33 mmHG and mild aortic insufficiency c/w mild aortic stenosis.  Structurally normal tricuspid valve.  Mild tricuspid regurgitation.  Right ventricular systolic pressure is estimated to be 29mmHg.           Current medicines (including changes today)  Current Outpatient Prescriptions   Medication Sig Dispense Refill   • lisinopril (PRINIVIL, ZESTRIL) 40 MG tablet Take 1 Tab by mouth every day. 90 Tab 3   • amLODIPine (NORVASC) 10 MG Tab Take 1 Tab by mouth every day. 90 Tab 3   • vitamin D (CHOLECALCIFEROL) 1000 UNIT Tab Take 1,000 Units by mouth every day.       No current facility-administered medications for this visit.      She  has a past medical history of Arthritis; Cataract; Heart murmur; Hiatus hernia syndrome; Hip pain (8/26/2013); HTN; Hypertension; MEDICAL HOME; and Postmenopausal HRT (hormone replacement therapy).    ROS as stated in hpi  No chest pain, no shortness of breath, no abdominal pain       Objective:     Blood pressure (!) 162/50, pulse 82, temperature 36.8 °C (98.2 °F), temperature source Temporal, resp. rate 14,  "height 1.549 m (5' 1\"), weight 39.5 kg (87 lb), last menstrual period 01/01/1959, SpO2 96 %. Body mass index is 16.44 kg/m².   Physical Exam:  Constitutional: Alert, no distress.  Skin: Warm, dry, good turgor,no cyanosis, no rashes in visible areas.  Eye: Equal, round and reactive, conjunctiva clear, lids normal.  Ears: No tenderness, no discharge.  External canals are without any significant edema or erythema.  Tympanic membranes are without any inflammation, no effusion.  Gross auditory acuity is intact.  Nose: symmetrical without tenderness, no discharge.  Mouth/Throat: lips without lesion.  Oropharynx clear.  Throat without erythema, exudates or tonsillar enlargement.  Neck: Trachea midline, no masses, no obvious thyroid enlargement.. No cervical or supraclavicular lymphadenopathy. Range of motion within normal limits.  Neuro: Cranial nerves 2-12 grossly intact.  No sensory deficit.  Respiratory: Unlabored respiratory effort, lungs clear to auscultation, no wheezes, no ronchi.  Cardiovascular: Normal S1, S2,holosytolic murmur at base, no edema.  Abdomen: Soft, non-tender, no masses, no guarding,  no hepatosplenomegaly.  Psych: Alert and oriented x3, normal affect and mood and judgement.        Assessment and Plan:   The following treatment plan was discussed    1. Full incontinence of feces  This is a new problem to me.  Chronic one year.  Patient has memory loss. Unaware of this issue.  Will culture urine.  UA in office with some blood and small leukocytes.  Discussed hygiene ideas with daughter.  Monitor and follow results.   - URINALYSIS,CULTURE IF INDICATED; Future    2. Heart murmur, systolic  This is a new problem to me.  Noted on exam.  Echo in 2016.  Monitor and follow.     3. Need for vaccination  Due for vaccine. No acute illness. Daily smoker  I have placed the below orders and discussed them with an approved delegating provider.  The MA is performing the below orders under the direction of " Dominic. <D.    - Influenza Vaccine, High Dose (65+ Only)      Followup: No Follow-up on file.         Educated in proper administration of medication(s) ordered today including safety, possible SE, risks, benefits, rationale and alternatives to therapy.     Please note that this dictation was created using voice recognition software. I have made every reasonable attempt to correct obvious errors, but I expect that there are errors of grammar and possibly content that I did not discover before finalizing the note.

## 2019-01-05 DIAGNOSIS — R15.9 FULL INCONTINENCE OF FECES: ICD-10-CM

## 2019-01-05 LAB
APPEARANCE UR: CLEAR
BACTERIA #/AREA URNS HPF: NEGATIVE /HPF
COLOR UR: YELLOW
EPI CELLS #/AREA URNS HPF: NORMAL /HPF
GLUCOSE UR STRIP.AUTO-MCNC: NEGATIVE MG/DL
HYALINE CASTS #/AREA URNS LPF: NORMAL /LPF
KETONES UR STRIP.AUTO-MCNC: NEGATIVE MG/DL
LEUKOCYTE ESTERASE UR QL STRIP.AUTO: ABNORMAL
MICRO URNS: ABNORMAL
NITRITE UR QL STRIP.AUTO: NEGATIVE
PH UR STRIP.AUTO: 7.5 [PH]
PROT UR QL STRIP: NEGATIVE MG/DL
RBC # URNS HPF: NORMAL /HPF
RBC UR QL AUTO: NEGATIVE
SP GR UR STRIP.AUTO: 1.01
WBC #/AREA URNS HPF: NORMAL /HPF

## 2019-05-30 ENCOUNTER — TELEPHONE (OUTPATIENT)
Dept: MEDICAL GROUP | Facility: PHYSICIAN GROUP | Age: 84
End: 2019-05-30

## 2019-05-30 NOTE — TELEPHONE ENCOUNTER
Future Appointments       Provider Department Center    6/3/2019 12:40 PM EVA Durbin [facility] [place]        ESTABLISHED PATIENT PRE-VISIT PLANNING     Patient was NOT contacted to complete PVP.      1.  Reviewed notes from the last few office visits within the medical group: Yes 1/4/19    2.  If any orders were placed at last visit or intended to be done for this visit (i.e. 6 mos follow-up), do we have Results/Consult Notes?        •  Labs - Labs ordered, completed on 1/5/19 and results are in chart.       •  Imaging - Imaging was not ordered at last office visit.       •  Referrals - No referrals were ordered at last office visit.    3. Is this appointment scheduled as a Hospital Follow-Up? No    4.  Immunizations were updated in [employer] using WebIZ?: Yes       •  Web Iz Recommendations: PNEUMOVAX (PPSV23), TDAP, VARICELLA (Chicken Pox)  and SHINGRIX (Shingles)    5.  Patient is due for the following Health Maintenance Topics:   Health Maintenance Due   Topic Date Due   • Annual Wellness Visit  01/02/1926   • IMM ZOSTER VACCINES (1 of 2) 01/02/1976   • IMM PNEUMOCOCCAL 65+ (ADULT) LOW/MEDIUM RISK SERIES (2 of 2 - PPSV23) 11/07/2017       6. Orders for overdue Health Maintenance topics pended in Pre-Charting? NO    7.  AHA (MDX) form printed for Provider? YES    8.  Patient was NOT informed to arrive 15 min prior to their scheduled appointment and bring in their medication bottles.

## 2019-07-01 RX ORDER — AMLODIPINE BESYLATE 10 MG/1
TABLET ORAL
Qty: 90 TAB | Refills: 2 | Status: SHIPPED | OUTPATIENT
Start: 2019-07-01 | End: 2019-12-31 | Stop reason: SDUPTHER

## 2019-07-01 RX ORDER — LISINOPRIL 40 MG/1
TABLET ORAL
Qty: 90 TAB | Refills: 2 | Status: SHIPPED | OUTPATIENT
Start: 2019-07-01 | End: 2019-12-31 | Stop reason: SDUPTHER

## 2019-07-01 NOTE — TELEPHONE ENCOUNTER
Requested Prescriptions     Signed Prescriptions Disp Refills   • lisinopril (PRINIVIL, ZESTRIL) 40 MG tablet 90 Tab 2     Sig: TAKE 1 TABLET BY MOUTH ONCE DAILY     Authorizing Provider: OLI ORELLANA   • amLODIPine (NORVASC) 10 MG Tab 90 Tab 2     Sig: TAKE 1 TABLET BY MOUTH ONCE DAILY     Authorizing Provider: OLI ORELLANA A.P.R.N.

## 2019-12-31 ENCOUNTER — OFFICE VISIT (OUTPATIENT)
Dept: MEDICAL GROUP | Facility: PHYSICIAN GROUP | Age: 84
End: 2019-12-31
Payer: MEDICARE

## 2019-12-31 VITALS
HEART RATE: 82 BPM | HEIGHT: 61 IN | DIASTOLIC BLOOD PRESSURE: 50 MMHG | BODY MASS INDEX: 17.56 KG/M2 | RESPIRATION RATE: 14 BRPM | TEMPERATURE: 97.5 F | OXYGEN SATURATION: 95 % | WEIGHT: 93 LBS | SYSTOLIC BLOOD PRESSURE: 120 MMHG

## 2019-12-31 DIAGNOSIS — R15.9 FULL INCONTINENCE OF FECES: ICD-10-CM

## 2019-12-31 DIAGNOSIS — Z23 NEED FOR VACCINATION: ICD-10-CM

## 2019-12-31 DIAGNOSIS — Z72.0 TOBACCO ABUSE: ICD-10-CM

## 2019-12-31 DIAGNOSIS — R01.1 HEART MURMUR, SYSTOLIC: ICD-10-CM

## 2019-12-31 DIAGNOSIS — R63.4 WEIGHT LOSS: ICD-10-CM

## 2019-12-31 DIAGNOSIS — I10 ESSENTIAL HYPERTENSION: ICD-10-CM

## 2019-12-31 PROCEDURE — 99214 OFFICE O/P EST MOD 30 MIN: CPT | Mod: 25 | Performed by: NURSE PRACTITIONER

## 2019-12-31 PROCEDURE — 90662 IIV NO PRSV INCREASED AG IM: CPT | Performed by: NURSE PRACTITIONER

## 2019-12-31 PROCEDURE — G0008 ADMIN INFLUENZA VIRUS VAC: HCPCS | Performed by: NURSE PRACTITIONER

## 2019-12-31 RX ORDER — AMLODIPINE BESYLATE 10 MG/1
TABLET ORAL
Qty: 100 TAB | Refills: 3 | Status: SHIPPED | OUTPATIENT
Start: 2019-12-31

## 2019-12-31 RX ORDER — LISINOPRIL 40 MG/1
TABLET ORAL
Qty: 90 TAB | Refills: 0 | Status: SHIPPED | OUTPATIENT
Start: 2019-12-31 | End: 2019-12-31 | Stop reason: SDUPTHER

## 2019-12-31 RX ORDER — LISINOPRIL 40 MG/1
TABLET ORAL
Qty: 100 TAB | Refills: 3 | Status: SHIPPED | OUTPATIENT
Start: 2019-12-31

## 2019-12-31 RX ORDER — AMLODIPINE BESYLATE 10 MG/1
TABLET ORAL
Qty: 90 TAB | Refills: 0 | Status: SHIPPED | OUTPATIENT
Start: 2019-12-31 | End: 2019-12-31 | Stop reason: SDUPTHER

## 2019-12-31 NOTE — TELEPHONE ENCOUNTER
Was the patient seen in the last year in this department? Yes    Does patient have an active prescription for medications requested? Yes INSURANCE REQUIRES #100    Received Request Via: Pharmacy

## 2019-12-31 NOTE — TELEPHONE ENCOUNTER
Requested Prescriptions     Signed Prescriptions Disp Refills   • lisinopril (PRINIVIL) 40 MG tablet 100 Tab 3     Sig: TAKE 1 TABLET BY MOUTH ONCE DAILY     Authorizing Provider: OLI ORELLANA   • amLODIPine (NORVASC) 10 MG Tab 100 Tab 3     Sig: TAKE 1 TABLET BY MOUTH ONCE DAILY     Authorizing Provider: OLI ORELLANA A.P.R.N.

## 2019-12-31 NOTE — ASSESSMENT & PLAN NOTE
BP at goal today.  Taking lisinopril/amlodipine daily.  120/50 today.  No chest pain reported,no headaches.  Some fatigue reported.

## 2019-12-31 NOTE — PROGRESS NOTES
"Chief Complaint   Patient presents with   • Follow-Up   • Medication Refill       Subjective:   Bibi Goldman is a 89 y.o. female here today for annnual exam    HTN (hypertension)  BP at goal today.  Taking lisinopril/amlodipine daily.  120/50 today.  No chest pain reported,no headaches.  Some fatigue reported.     Full incontinence of feces  Has some fecal incontinence, but daughter feels like it is due to her not being able to wipe despite having diaper wipes by the toilet.      Weight loss  Weight today is up 4 pounds from last visit.     Tobacco abuse  Daughter reports that she quite one year ago.      Heart murmur, systolic  Continues with systolic murmur.  No ankle swelling noted.           Current medicines (including changes today)  Current Outpatient Medications   Medication Sig Dispense Refill   • lisinopril (PRINIVIL) 40 MG tablet TAKE 1 TABLET BY MOUTH ONCE DAILY 90 Tab 0   • amLODIPine (NORVASC) 10 MG Tab TAKE 1 TABLET BY MOUTH ONCE DAILY 90 Tab 0   • vitamin D (CHOLECALCIFEROL) 1000 UNIT Tab Take 1,000 Units by mouth every day.       No current facility-administered medications for this visit.      She  has a past medical history of Arthritis, Cataract, Heart murmur, Hiatus hernia syndrome, Hip pain (8/26/2013), HTN, Hypertension, MEDICAL HOME, and Postmenopausal HRT (hormone replacement therapy).    ROS as stated in hpi  No chest pain, no shortness of breath, no abdominal pain       Objective:     /50 (BP Location: Left arm, Patient Position: Sitting)   Pulse 82   Temp 36.4 °C (97.5 °F) (Temporal)   Resp 14   Ht 1.549 m (5' 1\")   Wt 42.2 kg (93 lb)   SpO2 95%  Body mass index is 17.57 kg/m².   Physical Exam:  Constitutional: Alert, no distress.  Skin: Warm, dry, good turgor,no cyanosis, no rashes in visible areas.  Eye: Equal, round and reactive, conjunctiva clear, lids normal.  Ears: No tenderness, no discharge.  External canals are without any significant edema or erythema.  Tympanic " membranes are without any inflammation, no effusion.  Gross auditory acuity is intact.  Nose: symmetrical without tenderness, no discharge.  Mouth/Throat: lips without lesion.  Oropharynx clear.  Throat without erythema, exudates or tonsillar enlargement.  Neck: Trachea midline, no masses, no obvious thyroid enlargement.. No cervical or supraclavicular lymphadenopathy. Range of motion within normal limits.  Neuro: Cranial nerves 2-12 grossly intact.  No sensory deficit.  Respiratory: Unlabored respiratory effort, lungs clear to auscultation, no wheezes, no ronchi.  Cardiovascular: Normal S1, S2, holosystolic murmur noted throughout exam, no edema  Psych: Alert and oriented x3, normal affect and mood and judgement. Short term memory loss apparent        Assessment and Plan:   The following treatment plan was discussed    1. Need for vaccination  Due for vaccine,  No acute illness  I have placed the below orders and discussed them with an approved delegating provider.  The MA is performing the below orders under the direction of Dr. Jose MD.    - INFLUENZA VACCINE, HIGH DOSE (65+ ONLY)    2. Essential hypertension  Chronic,, ongoing. Stable.  At goal. Refill on lisinopril/hctz.  Patient declines yearly labs today. Monitor and follow    3. Full incontinence of feces  Chronic, ongoing. Encouraged patient to use diaper wipes.  Memory loss is making this difficult.  Not currently needing depends, occasional soiling reported.  Monitor.      4. Weight loss  Chronic, ongoing. Improved.  Weight is up 4 pounds.     5. Tobacco abuse  Patient has stopped smoking about one year ago.     6. Heart murmur, systolic  Chronic, ongoing. Stable.  No increased shortness of breath or edema reported.  Monitor and follow.       Followup: Return in about 6 months (around 6/30/2020).         Educated in proper administration of medication(s) ordered today including safety, possible SE, risks, benefits, rationale and alternatives to  therapy.     Please note that this dictation was created using voice recognition software. I have made every reasonable attempt to correct obvious errors, but I expect that there are errors of grammar and possibly content that I did not discover before finalizing the note.

## 2019-12-31 NOTE — ASSESSMENT & PLAN NOTE
Has some fecal incontinence, but daughter feels like it is due to her not being able to wipe despite having diaper wipes by the toilet.

## 2020-01-17 ENCOUNTER — PATIENT OUTREACH (OUTPATIENT)
Dept: HEALTH INFORMATION MANAGEMENT | Facility: OTHER | Age: 85
End: 2020-01-17

## 2020-05-22 ENCOUNTER — TELEPHONE (OUTPATIENT)
Dept: MEDICAL GROUP | Facility: PHYSICIAN GROUP | Age: 85
End: 2020-05-22

## 2020-05-22 NOTE — TELEPHONE ENCOUNTER
Patient's daughter called stating her mother's Death Certificate was filled out incorrectly and as a result, her mother has been sitting at the [facility] since 5/5/20. She says the [facility] sent it to Maribel Vaca and some how Dr Garcia is the one that signed off on the certificate and instead of putting one specific reason for death, he listed multiple reasons which has prevented her body from being released. She wants this mistake rectified TODAY.  Advised Dr Garcia is actively working to correct this immediately and we will contact her as soon as this is resolved.  Preeti [phone]